# Patient Record
Sex: FEMALE | Race: WHITE
[De-identification: names, ages, dates, MRNs, and addresses within clinical notes are randomized per-mention and may not be internally consistent; named-entity substitution may affect disease eponyms.]

---

## 2018-08-31 ENCOUNTER — HOSPITAL ENCOUNTER (EMERGENCY)
Dept: HOSPITAL 26 - MED | Age: 83
Discharge: HOME | End: 2018-08-31
Payer: COMMERCIAL

## 2018-08-31 VITALS — HEIGHT: 62 IN | BODY MASS INDEX: 22.08 KG/M2 | WEIGHT: 120 LBS

## 2018-08-31 VITALS — SYSTOLIC BLOOD PRESSURE: 140 MMHG | DIASTOLIC BLOOD PRESSURE: 96 MMHG

## 2018-08-31 VITALS — SYSTOLIC BLOOD PRESSURE: 138 MMHG | DIASTOLIC BLOOD PRESSURE: 91 MMHG

## 2018-08-31 DIAGNOSIS — S52.612A: ICD-10-CM
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
## 2018-08-31 DIAGNOSIS — S52.592A: Primary | ICD-10-CM
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
## 2018-08-31 PROCEDURE — 12004 RPR S/N/AX/GEN/TRK7.6-12.5CM: CPT

## 2018-08-31 PROCEDURE — 73110 X-RAY EXAM OF WRIST: CPT

## 2018-08-31 PROCEDURE — 99284 EMERGENCY DEPT VISIT MOD MDM: CPT

## 2018-08-31 NOTE — NUR
Patient discharged with v/s stable. Written and verbal after care instructions 
given and explained. Patient alert, oriented and verbalized understanding of 
instructions. Ambulatory with steady gait. All questions addressed prior to 
discharge. ID band removed. Patient advised to follow up with PMD. Rx of 
BACITRACIN AND NORCO given. Patient educated on indication of medication 
including possible reaction and side effects. Opportunity to ask questions 
provided and answered.

## 2018-08-31 NOTE — NUR
87Y/F BIBA FOR C/O LEFT WRIST PAIN 5/10 S/P MECHANICAL FALL TODAY. ALSO C/O 
SKIN TEARS TO RT HAND AND NOSE. DENIES LOC. PT IS AAOX4, EVEN AND UNLABORED 
BREATHING; BED DOWN; BEDRAIL UP X 1; ER MD AWARE AND NOTIFIED OF PT STATUS. 



PMH: DENIES